# Patient Record
Sex: MALE | Race: OTHER | NOT HISPANIC OR LATINO | Employment: FULL TIME | URBAN - METROPOLITAN AREA
[De-identification: names, ages, dates, MRNs, and addresses within clinical notes are randomized per-mention and may not be internally consistent; named-entity substitution may affect disease eponyms.]

---

## 2018-05-10 ENCOUNTER — OFFICE VISIT (OUTPATIENT)
Dept: URGENT CARE | Facility: PHYSICIAN GROUP | Age: 23
End: 2018-05-10

## 2018-05-10 VITALS
HEIGHT: 76 IN | HEART RATE: 73 BPM | DIASTOLIC BLOOD PRESSURE: 70 MMHG | WEIGHT: 190 LBS | BODY MASS INDEX: 23.14 KG/M2 | OXYGEN SATURATION: 97 % | TEMPERATURE: 97.7 F | SYSTOLIC BLOOD PRESSURE: 110 MMHG

## 2018-05-10 DIAGNOSIS — L05.91 PILONIDAL CYST: ICD-10-CM

## 2018-05-10 PROCEDURE — 99202 OFFICE O/P NEW SF 15 MIN: CPT | Performed by: PHYSICIAN ASSISTANT

## 2018-05-10 RX ORDER — ACETAMINOPHEN 325 MG/1
650 TABLET ORAL EVERY 4 HOURS PRN
COMMUNITY
End: 2021-08-30

## 2018-05-10 RX ORDER — AMOXICILLIN AND CLAVULANATE POTASSIUM 875; 125 MG/1; MG/1
1 TABLET, FILM COATED ORAL 2 TIMES DAILY
Qty: 14 TAB | Refills: 0 | Status: SHIPPED | OUTPATIENT
Start: 2018-05-10 | End: 2018-05-17

## 2018-05-10 RX ORDER — IBUPROFEN 200 MG
200 TABLET ORAL EVERY 6 HOURS PRN
COMMUNITY
End: 2023-10-18

## 2018-05-10 ASSESSMENT — ENCOUNTER SYMPTOMS
FEVER: 0
COUGH: 0
PALPITATIONS: 0
SHORTNESS OF BREATH: 0
ROS SKIN COMMENTS: CYST ON TAILBONE

## 2018-05-10 NOTE — LETTER
May 10, 2018         Patient: Saji Kamara   YOB: 1995   Date of Visit: 5/10/2018           To Whom it May Concern:    Saji Kamara was seen in my clinic on 5/10/2018. Please excuse him from work 5/10-5/13/2018.  If you have any questions or concerns, please don't hesitate to call.        Sincerely,           James Arellano P.A.-C.  Electronically Signed

## 2018-05-10 NOTE — PATIENT INSTRUCTIONS
Pilonidal Cyst  Introduction  A pilonidal cyst is a fluid-filled sac. It forms beneath the skin near your tailbone, at the top of the crease of your buttocks. A pilonidal cyst that is not large or infected may not cause symptoms or problems.  If the cyst becomes irritated or infected, it may fill with pus. This causes pain and swelling (pilonidal abscess). An infected cyst may need to be treated with medicine, drained, or removed.  What are the causes?  The cause of a pilonidal cyst is not known. One cause may be a hair that grows into your skin (ingrown hair).  What increases the risk?  Pilonidal cysts are more common in boys and men. Risk factors include:  · Having lots of hair near the crease of the buttocks.  · Being overweight.  · Having a pilonidal dimple.  · Wearing tight clothing.  · Not bathing or showering frequently.  · Sitting for long periods of time.  What are the signs or symptoms?  Signs and symptoms of a pilonidal cyst may include:  · Redness.  · Pain and tenderness.  · Warmth.  · Swelling.  · Pus.  · Fever.  How is this diagnosed?  Your health care provider may diagnose a pilonidal cyst based on your symptoms and a physical exam. The health care provider may do a blood test to check for infection. If your cyst is draining pus, your health care provider may take a sample of the drainage to be tested at a laboratory.  How is this treated?  Surgery is the usual treatment for an infected pilonidal cyst. You may also have to take medicines before surgery. The type of surgery you have depends on the size and severity of the infected cyst. The different kinds of surgery include:  · Incision and drainage. This is a procedure to open and drain the cyst.  · Marsupialization. In this procedure, a large cyst or abscess may be opened and kept open by stitching the edges of the skin to the cyst walls.  · Cyst removal. This procedure involves opening the skin and removing all or part of the cyst.  Follow these  instructions at home:  · Follow all of your surgeon’s instructions carefully if you had surgery.  · Take medicines only as directed by your health care provider.  · If you were prescribed an antibiotic medicine, finish it all even if you start to feel better.  · Keep the area around your pilonidal cyst clean and dry.  · Clean the area as directed by your health care provider. Pat the area dry with a clean towel. Do not rub it as this may cause bleeding.  · Remove hair from the area around the cyst as directed by your health care provider.  · Do not wear tight clothing or sit in one place for long periods of time.  · There are many different ways to close and cover an incision, including stitches, skin glue, and adhesive strips. Follow your health care provider's instructions on:  ¨ Incision care.  ¨ Bandage (dressing) changes and removal.  ¨ Incision closure removal.  Contact a health care provider if:  · You have drainage, redness, swelling, or pain at the site of the cyst.  · You have a fever.  This information is not intended to replace advice given to you by your health care provider. Make sure you discuss any questions you have with your health care provider.  Document Released: 12/15/2001 Document Revised: 05/25/2017 Document Reviewed: 05/07/2015  © 2017 [employer]

## 2018-05-11 NOTE — PROGRESS NOTES
"Subjective:      Saji Kamara is a 23 y.o. male who presents with Tailbone Pain (x4days )            Cyst   This is a new problem. The current episode started in the past 7 days. The problem occurs constantly. The problem has been gradually improving. Pertinent negatives include no chest pain, coughing or fever. Associated symptoms comments: Draining cyst on tail bone. Treatments tried: warm compress. The treatment provided moderate relief.       Review of Systems   Constitutional: Negative for fever and malaise/fatigue.   Respiratory: Negative for cough and shortness of breath.    Cardiovascular: Negative for chest pain and palpitations.   Skin:        Cyst on tailbone     All other systems reviewed and are negative.    PMH:  has a past medical history of Screening for STD (sexually transmitted disease) (2/10/2016).  MEDS:   Current Outpatient Prescriptions:   •  acetaminophen (TYLENOL) 325 MG Tab, Take 650 mg by mouth every four hours as needed., Disp: , Rfl:   •  ibuprofen (MOTRIN) 200 MG Tab, Take 200 mg by mouth every 6 hours as needed., Disp: , Rfl:   •  amoxicillin-clavulanate (AUGMENTIN) 875-125 MG Tab, Take 1 Tab by mouth 2 times a day for 7 days., Disp: 14 Tab, Rfl: 0  •  azithromycin (ZITHROMAX) 500 MG tablet, Take all at once  Indications: Inflammation of the Urethra Not due to Gonorrhea, Disp: 4 Tab, Rfl: 0  ALLERGIES: No Known Allergies  SURGHX: History reviewed. No pertinent surgical history.  SOCHX:  reports that he has been smoking Cigarettes.  He has never used smokeless tobacco. He reports that he drinks alcohol. He reports that he does not use drugs.  FH: Family history was reviewed, no pertinent findings to report  Medications, Allergies, and current problem list reviewed today in Epic       Objective:     /70   Pulse 73   Temp 36.5 °C (97.7 °F)   Ht 1.93 m (6' 4\")   Wt 86.2 kg (190 lb)   SpO2 97%   BMI 23.13 kg/m²      Physical Exam   Constitutional: He appears well-developed and " well-nourished.   Cardiovascular: Normal rate, regular rhythm and normal heart sounds.    Pulmonary/Chest: Effort normal and breath sounds normal.   Skin: Skin is warm and dry.        Psychiatric: He has a normal mood and affect. His behavior is normal. Judgment and thought content normal.   Vitals reviewed.              Assessment/Plan:   Currently draining pilonidal cyst.  No fluctuate area.  Trial of antibiotic and warm compress.  If worsening I and D.  1. Pilonidal cyst  - warm compress 3 times daily for 15 minutes  - amoxicillin-clavulanate (AUGMENTIN) 875-125 MG Tab; Take 1 Tab by mouth 2 times a day for 7 days.  Dispense: 14 Tab; Refill: 0    Differential diagnosis, natural history, supportive care discussed. Follow-up with primary care provider within 7-10 days, emergency room precautions discussed.  Patient and/or family appears understanding of information.

## 2018-05-16 ENCOUNTER — OFFICE VISIT (OUTPATIENT)
Dept: URGENT CARE | Facility: PHYSICIAN GROUP | Age: 23
End: 2018-05-16

## 2018-05-16 VITALS
BODY MASS INDEX: 23.62 KG/M2 | HEART RATE: 60 BPM | TEMPERATURE: 97.7 F | OXYGEN SATURATION: 97 % | SYSTOLIC BLOOD PRESSURE: 118 MMHG | DIASTOLIC BLOOD PRESSURE: 84 MMHG | WEIGHT: 194 LBS | HEIGHT: 76 IN

## 2018-05-16 DIAGNOSIS — L05.91 PILONIDAL CYST WITHOUT ABSCESS: ICD-10-CM

## 2018-05-16 PROCEDURE — 99213 OFFICE O/P EST LOW 20 MIN: CPT | Performed by: EMERGENCY MEDICINE

## 2018-05-16 ASSESSMENT — ENCOUNTER SYMPTOMS
CONSTIPATION: 0
ABDOMINAL PAIN: 0
DIARRHEA: 0
FEVER: 0

## 2018-05-16 NOTE — PROGRESS NOTES
"Subjective:      Saji Kamara is a 23 y.o. male who presents with Cyst (on tailbone x1.5weeks; pt requesting removal )            Cyst   This is a new problem. Episode onset: over one week. The problem has been gradually improving. Pertinent negatives include no abdominal pain, fever or rash. Exacerbated by: pressure on site. Treatments tried: Augmentin. The treatment provided significant relief.   No trauma or similar prior events. He notes 2 episodes of spontaneous drainage at the site; currently taking Augmentin. Patient states requires surgical excision prior to being able to enter the . Requests referral for surgical excision.    Review of Systems   Constitutional: Negative for fever.   Gastrointestinal: Negative for abdominal pain, constipation and diarrhea.   Skin: Negative for rash.     PMH:  has a past medical history of Screening for STD (sexually transmitted disease) (2/10/2016).  MEDS:   Current Outpatient Prescriptions:   •  amoxicillin-clavulanate (AUGMENTIN) 875-125 MG Tab, Take 1 Tab by mouth 2 times a day for 7 days., Disp: 14 Tab, Rfl: 0  •  acetaminophen (TYLENOL) 325 MG Tab, Take 650 mg by mouth every four hours as needed., Disp: , Rfl:   •  ibuprofen (MOTRIN) 200 MG Tab, Take 200 mg by mouth every 6 hours as needed., Disp: , Rfl:   •  azithromycin (ZITHROMAX) 500 MG tablet, Take all at once  Indications: Inflammation of the Urethra Not due to Gonorrhea, Disp: 4 Tab, Rfl: 0  ALLERGIES: No Known Allergies  SURGHX: History reviewed. No pertinent surgical history.  SOCHX:  reports that he has been smoking Cigarettes.  He has never used smokeless tobacco. He reports that he drinks alcohol. He reports that he does not use drugs.  FH: family history includes Cancer in his mother and sister; Hyperlipidemia in his father; Hypertension in his father.       Objective:     /84   Pulse 60   Temp 36.5 °C (97.7 °F)   Ht 1.93 m (6' 4\")   Wt 88 kg (194 lb)   SpO2 97%   BMI 23.61 kg/m²  "     Physical Exam   Constitutional: He is oriented to person, place, and time. He appears well-developed and well-nourished. He is cooperative. He does not appear ill. No distress.   Genitourinary: Rectal exam shows no external hemorrhoid and no mass.   Musculoskeletal:        Lumbar back: He exhibits no tenderness and no bony tenderness.   Neurological: He is alert and oriented to person, place, and time. Gait normal.   Skin: Skin is warm and dry. Lesion noted. No rash noted.        Psychiatric: He has a normal mood and affect.               Assessment/Plan:     1. Pilonidal cyst without abscess  Finish course of Augmentin, warm compresses.  Advised to return if any recurrent increased swelling or drainage.  - REFERRAL TO GENERAL SURGERY

## 2018-07-02 ENCOUNTER — OFFICE VISIT (OUTPATIENT)
Dept: URGENT CARE | Facility: PHYSICIAN GROUP | Age: 23
End: 2018-07-02

## 2018-07-02 VITALS
DIASTOLIC BLOOD PRESSURE: 84 MMHG | BODY MASS INDEX: 23.62 KG/M2 | HEART RATE: 77 BPM | WEIGHT: 194 LBS | RESPIRATION RATE: 14 BRPM | SYSTOLIC BLOOD PRESSURE: 118 MMHG | TEMPERATURE: 98.1 F | OXYGEN SATURATION: 97 % | HEIGHT: 76 IN

## 2018-07-02 DIAGNOSIS — L02.91 ABSCESS: ICD-10-CM

## 2018-07-02 PROCEDURE — 99213 OFFICE O/P EST LOW 20 MIN: CPT | Performed by: FAMILY MEDICINE

## 2018-07-02 ASSESSMENT — PAIN SCALES - GENERAL: PAINLEVEL: NO PAIN

## 2018-07-02 NOTE — LETTER
July 2, 2018         Patient: Saji Kamara   YOB: 1995   Date of Visit: 7/2/2018           To Whom it May Concern:    Saji Kamara was seen in my clinic on 7/2/2018. He may return to work on 7/2/2018 without restriction..    If you have any questions or concerns, please don't hesitate to call.        Sincerely,           Walter Quintero M.D.  Electronically Signed

## 2018-07-04 ASSESSMENT — ENCOUNTER SYMPTOMS
CHILLS: 0
FEVER: 0

## 2018-07-26 ENCOUNTER — OFFICE VISIT (OUTPATIENT)
Dept: MEDICAL GROUP | Facility: PHYSICIAN GROUP | Age: 23
End: 2018-07-26

## 2018-07-26 VITALS
HEART RATE: 49 BPM | TEMPERATURE: 98.4 F | BODY MASS INDEX: 23.61 KG/M2 | HEIGHT: 76 IN | SYSTOLIC BLOOD PRESSURE: 118 MMHG | RESPIRATION RATE: 18 BRPM | OXYGEN SATURATION: 99 % | DIASTOLIC BLOOD PRESSURE: 64 MMHG | WEIGHT: 193.9 LBS

## 2018-07-26 DIAGNOSIS — L05.91 PILONIDAL CYST WITHOUT ABSCESS: ICD-10-CM

## 2018-07-26 ASSESSMENT — PATIENT HEALTH QUESTIONNAIRE - PHQ9: CLINICAL INTERPRETATION OF PHQ2 SCORE: 0

## 2018-07-27 ENCOUNTER — OFFICE VISIT (OUTPATIENT)
Dept: MEDICAL GROUP | Facility: PHYSICIAN GROUP | Age: 23
End: 2018-07-27

## 2018-07-27 VITALS
HEART RATE: 56 BPM | OXYGEN SATURATION: 98 % | RESPIRATION RATE: 18 BRPM | SYSTOLIC BLOOD PRESSURE: 118 MMHG | BODY MASS INDEX: 23.5 KG/M2 | WEIGHT: 193 LBS | TEMPERATURE: 97.6 F | DIASTOLIC BLOOD PRESSURE: 84 MMHG | HEIGHT: 76 IN

## 2018-07-27 DIAGNOSIS — L05.91 PILONIDAL CYST WITHOUT ABSCESS: ICD-10-CM

## 2018-07-27 PROCEDURE — 99213 OFFICE O/P EST LOW 20 MIN: CPT | Performed by: NURSE PRACTITIONER

## 2018-07-27 ASSESSMENT — ENCOUNTER SYMPTOMS
FEVER: 0
MYALGIAS: 0
CHILLS: 0

## 2018-07-27 NOTE — PROGRESS NOTES
Subjective:   Saji Kamara is a 23 y.o. male here today for follow up on pilonidal cyst. He is not interested in establishing care at this facility.    Pilonidal cyst without abscess  Patient was evaluated on May 21, 2018 at [facility] for complaints of coccyx pain.  He was diagnosed with a pilonidal cyst.  Provider recommendations were conservative therapy initially with plans for reserving surgery if conservative treatments failed.  He was prescribed azithromycin ×10 days.  Patient was seen again on July 2 at [place] for follow-up of cyst.  His symptoms had resolved at this time.    Today he is requesting [facility] paperwork from May 21 - July 2 during his time of recovery.        Current medicines (including changes today)  Current Outpatient Prescriptions   Medication Sig Dispense Refill   • acetaminophen (TYLENOL) 325 MG Tab Take 650 mg by mouth every four hours as needed.     • ibuprofen (MOTRIN) 200 MG Tab Take 200 mg by mouth every 6 hours as needed.       No current facility-administered medications for this visit.      He  has a past medical history of Screening for STD (sexually transmitted disease) (2/10/2016).    Social History     Social History   • Marital status: Single     Spouse name: N/A   • Number of children: N/A   • Years of education: N/A     Occupational History   • Not on file.     Social History Main Topics   • Smoking status: Current Every Day Smoker     Types: Cigarettes   • Smokeless tobacco: Never Used      Comment: 1 or 2 cigarettes a day    • Alcohol use Yes      Comment: occasional   • Drug use: No   • Sexual activity: Yes     Partners: Female     Other Topics Concern   • Sleep Concern No   • Stress Concern No   • Weight Concern No   • Bike Helmet Yes   • Seat Belt Yes     Social History Narrative    Lives with mom, dad, brother       Review of Systems   Constitutional: Negative for chills and fever.   Musculoskeletal: Negative for joint pain and myalgias.   Skin:  "Negative for itching and rash.      Objective:     Blood pressure 118/84, pulse (!) 56, temperature 36.4 °C (97.6 °F), resp. rate 18, height 1.93 m (6' 4\"), weight 87.5 kg (193 lb), SpO2 98 %. Body mass index is 23.49 kg/m².     Physical Exam:  Constitutional: Oriented to person, place, and time and well-developed, well-nourished, and in no distress.   HENT:   Head: Normocephalic and atraumatic.   Eyes: Conjunctivae and EOM are normal. Pupils are equal, round, and reactive to light.   Neck: Normal range of motion. Neck supple.  Cardiovascular: Normal rate, regular rhythm, normal heart sounds.  Exam reveals no friction rub. No murmur heard.  Pulmonary/Chest: Effort normal and breath sounds normal. No respiratory distress or use of accessory muscles. No wheezes, rhonchi, or rales.   Musculoskeletal: Full range of motion.   Neurological: Alert and oriented to person, place, and time. Gait normal.   Skin: Skin is warm and dry. No cyanosis. No edema.  Psychiatric: Mood, memory, affect and judgment normal.       Assessment and Plan:   The following treatment plan was discussed    1. Pilonidal cyst without abscess  His symptoms have resolved. Reviewed previous progress notes. [facility] paperwork has been completed.    Total 30 min spent face-to- face, >50% of total time discussing patient issues and symptoms as listed above in assessment and plan, reviewing progress notes with patient, and completing [facility] paperwork with patient.    Followup: Return if symptoms worsen or fail to improve.         "

## 2018-07-27 NOTE — ASSESSMENT & PLAN NOTE
Patient was evaluated on May 21, 2018 at [facility] for complaints of coccyx pain.  He was diagnosed with a pilonidal cyst.  Provider recommendations were conservative therapy initially with plans for reserving surgery if conservative treatments failed.  He was prescribed azithromycin ×10 days.  Patient was seen again on July 2 at [facility] for follow-up of cyst.  His symptoms had resolved at this time.    Today he is requesting [facility] paperwork from May 21 - July 2 during his time of recovery.

## 2020-11-30 ENCOUNTER — HOSPITAL ENCOUNTER (OUTPATIENT)
Facility: MEDICAL CENTER | Age: 25
End: 2020-11-30
Attending: NURSE PRACTITIONER

## 2020-11-30 ENCOUNTER — OFFICE VISIT (OUTPATIENT)
Dept: URGENT CARE | Facility: CLINIC | Age: 25
End: 2020-11-30

## 2020-11-30 VITALS
BODY MASS INDEX: 23.62 KG/M2 | HEART RATE: 97 BPM | DIASTOLIC BLOOD PRESSURE: 82 MMHG | TEMPERATURE: 98.1 F | OXYGEN SATURATION: 100 % | SYSTOLIC BLOOD PRESSURE: 122 MMHG | WEIGHT: 194 LBS | RESPIRATION RATE: 16 BRPM | HEIGHT: 76 IN

## 2020-11-30 DIAGNOSIS — J02.9 SORE THROAT: ICD-10-CM

## 2020-11-30 DIAGNOSIS — J03.90 EXUDATIVE TONSILLITIS: ICD-10-CM

## 2020-11-30 LAB
HETEROPH AB SER QL LA: NEGATIVE
INT CON NEG: NORMAL
INT CON NEG: NORMAL
INT CON POS: NORMAL
INT CON POS: NORMAL
S PYO AG THROAT QL: NEGATIVE

## 2020-11-30 PROCEDURE — 86308 HETEROPHILE ANTIBODY SCREEN: CPT | Performed by: NURSE PRACTITIONER

## 2020-11-30 PROCEDURE — 99214 OFFICE O/P EST MOD 30 MIN: CPT | Performed by: NURSE PRACTITIONER

## 2020-11-30 PROCEDURE — 96372 THER/PROPH/DIAG INJ SC/IM: CPT | Performed by: NURSE PRACTITIONER

## 2020-11-30 PROCEDURE — 87880 STREP A ASSAY W/OPTIC: CPT | Performed by: NURSE PRACTITIONER

## 2020-11-30 PROCEDURE — 87070 CULTURE OTHR SPECIMN AEROBIC: CPT

## 2020-11-30 RX ORDER — LIDOCAINE HYDROCHLORIDE 20 MG/ML
5 SOLUTION OROPHARYNGEAL 4 TIMES DAILY PRN
Qty: 120 ML | Refills: 0 | Status: SHIPPED | OUTPATIENT
Start: 2020-11-30 | End: 2021-08-30

## 2020-11-30 RX ORDER — DEXAMETHASONE SODIUM PHOSPHATE 4 MG/ML
8 INJECTION, SOLUTION INTRA-ARTICULAR; INTRALESIONAL; INTRAMUSCULAR; INTRAVENOUS; SOFT TISSUE ONCE
Status: COMPLETED | OUTPATIENT
Start: 2020-11-30 | End: 2020-11-30

## 2020-11-30 RX ORDER — PENICILLIN V POTASSIUM 500 MG/1
500 TABLET ORAL 3 TIMES DAILY
Qty: 30 TAB | Refills: 0 | Status: SHIPPED | OUTPATIENT
Start: 2020-11-30 | End: 2020-12-10

## 2020-11-30 RX ADMIN — DEXAMETHASONE SODIUM PHOSPHATE 8 MG: 4 INJECTION, SOLUTION INTRA-ARTICULAR; INTRALESIONAL; INTRAMUSCULAR; INTRAVENOUS; SOFT TISSUE at 15:07

## 2020-11-30 SDOH — HEALTH STABILITY: MENTAL HEALTH: HOW OFTEN DO YOU HAVE A DRINK CONTAINING ALCOHOL?: MONTHLY OR LESS

## 2020-11-30 NOTE — LETTER
November 30, 2020         Patient: Saji Kamara   YOB: 1995   Date of Visit: 11/30/2020           To Whom it May Concern:    Saji Kamara was seen in my clinic on 11/30/2020. He may be excused from work tomorrow.     If you have any questions or concerns, please don't hesitate to call.        Sincerely,           OSMAR Acosta.  Electronically Signed

## 2020-11-30 NOTE — PROGRESS NOTES
Chief Complaint   Patient presents with   • Pharyngitis     inflamed tonsils putting pressure on sinuses causing headaches, uvula is swollen x3 days.       HISTORY OF PRESENT ILLNESS: Patient is a 25 y.o. male who presents today due to complaints of a sore throat for the past three days. Reports associated uvula swelling, pain with swallowing, headaches. Pain is moderate. Denies associated fever, respiratory distress, rash, chest pain, urinary complaints, congestion, cough, or difficulty breathing.  He has tried OTC medications at home without much improvement.  Denies known ill contacts.       Patient Active Problem List    Diagnosis Date Noted   • Pilonidal cyst without abscess 2018       Allergies:Patient has no known allergies.    Current Outpatient Medications Ordered in Epic   Medication Sig Dispense Refill   • penicillin v potassium (VEETID) 500 MG Tab Take 1 Tab by mouth 3 times a day for 10 days. 30 Tab 0   • lidocaine (XYLOCAINE) 2 % Solution Take 5 mL by mouth 4 times a day as needed for Throat/Mouth Pain. Mix 5mL in 1/4 cup of water, swish medication and spit. 120 mL 0   • ibuprofen (MOTRIN) 200 MG Tab Take 200 mg by mouth every 6 hours as needed.     • acetaminophen (TYLENOL) 325 MG Tab Take 650 mg by mouth every four hours as needed.       No current Epic-ordered facility-administered medications on file.        Past Medical History:   Diagnosis Date   • Screening for STD (sexually transmitted disease) 2/10/2016       Social History     Tobacco Use   • Smoking status: Former Smoker     Types: Cigarettes   • Smokeless tobacco: Never Used   • Tobacco comment: 1 or 2 cigarettes a day    Substance Use Topics   • Alcohol use: Yes     Frequency: Monthly or less     Comment: occasional   • Drug use: No       Family Status   Relation Name Status   • Mo  Alive   • Fa  Alive   • MGMo     • MGFa     • PGMo  Alive   • PGFa  Alive   • Bro  Alive   • Sis  Alive           • Neg Hx  (Not  "Specified)     Family History   Problem Relation Age of Onset   • Cancer Mother         luekemia   • Hypertension Father    • Hyperlipidemia Father    • Cancer Sister         hogkins lymphoma   • Alcohol/Drug Neg Hx    • Allergies Neg Hx    • Anesthesia Neg Hx    • Arthritis Neg Hx    • Blood Disease Neg Hx    • Diabetes Neg Hx    • Genetic Disorder Neg Hx    • GI Disease Neg Hx    • Genitourinary () Problems Neg Hx    • Heart Disease Neg Hx    • Psychiatric Illness Neg Hx    • Stroke Neg Hx    • Thyroid Neg Hx        ROS:  Review of Systems   Constitutional: Negative for weight loss, fever, and fatigue.   HENT: Positive for sore throat. Negative for ear pain, nosebleeds, congestion.   Eyes: Negative for vision changes.   Neuro: Positive for headaches.  Cardiovascular: Negative for chest pain, palpitations, orthopnea and leg swelling.   Respiratory: Negative for cough, sputum production, shortness of breath and wheezing.   Gastrointestinal: Negative for abdominal pain, nausea, vomiting or diarrhea.   : Negative for changes in urination.   Skin: Negative for rash, diaphoresis.     Exam:  /82 (BP Location: Left arm, Patient Position: Sitting, BP Cuff Size: Adult)   Pulse 97   Temp 36.7 °C (98.1 °F) (Temporal)   Resp 16   Ht 1.93 m (6' 4\")   Wt 88 kg (194 lb)   SpO2 100%   General: well-nourished, well-developed male in NAD  Head: normocephalic, atraumatic  Eyes: PERRLA, no conjunctival injection, acuity grossly intact, lids normal.  Ears: normal shape and symmetry, no tenderness, no discharge. External canals are without any significant edema or erythema. Tympanic membranes are without any inflammation, no effusion. Gross auditory acuity is intact.  Nose: symmetrical without tenderness, no discharge.  Mouth/Throat: reasonable hygiene. There is erythema, with exudates and tonsillar enlargement present.  Swollen uvula noted.  Airway is patent.  Neck: no masses, range of motion within normal limits, no " tracheal deviation. No obvious thyroid enlargement.   Lymph: bilateral anterior cervical adenopathy. No supraclavicular adenopathy.   Neuro: alert and oriented. Cranial nerves 1-12 grossly intact. No sensory deficit.   Cardiovascular: regular rate and rhythm. No edema.  Pulmonary: no distress. Chest is symmetrical with respiration, no wheezes, crackles, or rhonchi.   Musculoskeletal: no clubbing, appropriate muscle tone, gait is stable.  Skin: warm, dry, intact, no clubbing, no cyanosis, no rashes.   Psych: appropriate mood, affect, judgement.       POC strep negative    POC mono negative      Assessment/Plan:  1. Exudative tonsillitis  CULTURE THROAT    penicillin v potassium (VEETID) 500 MG Tab    lidocaine (XYLOCAINE) 2 % Solution   2. Sore throat  POCT Rapid Strep A    POCT Mononucleosis (mono)    dexamethasone (DECADRON) injection 8 mg    CULTURE THROAT    penicillin v potassium (VEETID) 500 MG Tab    lidocaine (XYLOCAINE) 2 % Solution           Antibiotic as directed due to suspected bacterial etiology, throat culture obtained and sent for testing.  Decadron given in office, tolerated well.  Lidocaine as needed.  OTC motrin or tylenol for pain/fever control. Hand hygiene. Increase fluid intake, rest. Warm salt water gargles.   Supportive care, differential diagnoses, and indications for immediate follow-up discussed with patient.   Pathogenesis of diagnosis discussed including typical length and natural progression.   Instructed to return to clinic or nearest emergency department for any change in condition, further concerns, or worsening of symptoms.  Patient states understanding of the plan of care and discharge instructions.  Instructed to make an appointment, for follow up, with his primary care provider.        Please note that this dictation was created using voice recognition software. I have made every reasonable attempt to correct obvious errors, but I expect that there are errors of grammar and  possibly content that I did not discover before finalizing the note. N95 and safety glasses used for entire visit.       OSMAR Acosta.

## 2020-12-01 DIAGNOSIS — J03.90 EXUDATIVE TONSILLITIS: ICD-10-CM

## 2020-12-01 DIAGNOSIS — J02.9 SORE THROAT: ICD-10-CM

## 2020-12-03 LAB
BACTERIA SPEC RESP CULT: NORMAL
SIGNIFICANT IND 70042: NORMAL
SITE SITE: NORMAL
SOURCE SOURCE: NORMAL

## 2021-02-16 ENCOUNTER — HOSPITAL ENCOUNTER (OUTPATIENT)
Facility: MEDICAL CENTER | Age: 26
End: 2021-02-16
Attending: PHYSICIAN ASSISTANT

## 2021-02-16 ENCOUNTER — OFFICE VISIT (OUTPATIENT)
Dept: URGENT CARE | Facility: PHYSICIAN GROUP | Age: 26
End: 2021-02-16

## 2021-02-16 VITALS
HEIGHT: 76 IN | RESPIRATION RATE: 20 BRPM | BODY MASS INDEX: 23.99 KG/M2 | DIASTOLIC BLOOD PRESSURE: 78 MMHG | HEART RATE: 74 BPM | TEMPERATURE: 97.7 F | SYSTOLIC BLOOD PRESSURE: 104 MMHG | OXYGEN SATURATION: 97 % | WEIGHT: 197 LBS

## 2021-02-16 DIAGNOSIS — R36.9 PENILE DISCHARGE: ICD-10-CM

## 2021-02-16 DIAGNOSIS — R30.0 DYSURIA: ICD-10-CM

## 2021-02-16 LAB
APPEARANCE UR: NORMAL
BILIRUB UR STRIP-MCNC: NORMAL MG/DL
COLOR UR AUTO: YELLOW
GLUCOSE UR STRIP.AUTO-MCNC: NORMAL MG/DL
KETONES UR STRIP.AUTO-MCNC: NORMAL MG/DL
LEUKOCYTE ESTERASE UR QL STRIP.AUTO: NORMAL
NITRITE UR QL STRIP.AUTO: NORMAL
PH UR STRIP.AUTO: 6 [PH] (ref 5–8)
PROT UR QL STRIP: NORMAL MG/DL
RBC UR QL AUTO: NORMAL
SP GR UR STRIP.AUTO: 1.03
UROBILINOGEN UR STRIP-MCNC: NORMAL MG/DL

## 2021-02-16 PROCEDURE — 99213 OFFICE O/P EST LOW 20 MIN: CPT | Performed by: PHYSICIAN ASSISTANT

## 2021-02-16 PROCEDURE — 87591 N.GONORRHOEAE DNA AMP PROB: CPT

## 2021-02-16 PROCEDURE — 81002 URINALYSIS NONAUTO W/O SCOPE: CPT | Performed by: PHYSICIAN ASSISTANT

## 2021-02-16 PROCEDURE — 87491 CHLMYD TRACH DNA AMP PROBE: CPT

## 2021-02-16 RX ORDER — AZITHROMYCIN 500 MG/1
1000 TABLET, FILM COATED ORAL ONCE
Qty: 2 TABLET | Refills: 0 | Status: SHIPPED | OUTPATIENT
Start: 2021-02-16 | End: 2021-02-16

## 2021-02-16 ASSESSMENT — ENCOUNTER SYMPTOMS
VOMITING: 0
CHILLS: 0
FEVER: 0
PALPITATIONS: 0
DIARRHEA: 0
DIZZINESS: 0
MYALGIAS: 0
SORE THROAT: 0
NAUSEA: 0
HEADACHES: 0
ABDOMINAL PAIN: 0
FLANK PAIN: 0

## 2021-02-16 NOTE — PROGRESS NOTES
Subjective:   Saji Kamara is a 25 y.o. male who presents for Urinary Frequency (burn upon urination, discharge x 1 week)    HPI:  This is a very pleasant 25-year-old male presenting to the clinic with dysuria and penile discharge x1 week.  Patient states he had intercourse with a new female partner and believes he may have contracted chlamydia.  Currently has mild dysuria with every urination.  He has also noticed some thick whitish penile discharge.  He denies any rashes or lesions.  Denies any testicular pain.  He denies any nausea, vomiting, fevers or chills.  He has had chlamydia 1 time in the past and states this feels similar.      Review of Systems   Constitutional: Negative for chills, fever and malaise/fatigue.   HENT: Negative for sore throat.    Cardiovascular: Negative for chest pain and palpitations.   Gastrointestinal: Negative for abdominal pain, diarrhea, nausea and vomiting.   Genitourinary: Positive for dysuria. Negative for flank pain, frequency, hematuria and urgency.        Penile discharge   Musculoskeletal: Negative for myalgias.   Skin: Negative for rash.   Neurological: Negative for dizziness and headaches.       Medications:    • acetaminophen Tabs  • azithromycin  • ceftriaxone (ROCEPHIN) 250 mg - lidocaine 1% 0.9 mL for IM use  • ibuprofen Tabs  • lidocaine Soln    Allergies: Patient has no known allergies.    Problem List: Saji Kamara has Pilonidal cyst without abscess on their problem list.    Surgical History:  No past surgical history on file.    Past Social Hx: Saji Kamara  reports that he has quit smoking. His smoking use included cigarettes. He has never used smokeless tobacco. He reports current alcohol use. He reports that he does not use drugs.     Past Family Hx:  Saji Kamara family history includes Cancer in his mother and sister; Hyperlipidemia in his father; Hypertension in his father.     Problem list, medications, and allergies reviewed by myself today in  "Epic.     Objective:     /78 (BP Location: Left arm, Patient Position: Sitting, BP Cuff Size: Adult)   Pulse 74   Temp 36.5 °C (97.7 °F) (Temporal)   Resp 20   Ht 1.93 m (6' 4\")   Wt 89.4 kg (197 lb)   SpO2 97%   BMI 23.98 kg/m²     Physical Exam  Constitutional:       General: He is not in acute distress.     Appearance: Normal appearance. He is not ill-appearing, toxic-appearing or diaphoretic.   HENT:      Head: Normocephalic and atraumatic.   Eyes:      Conjunctiva/sclera: Conjunctivae normal.   Cardiovascular:      Rate and Rhythm: Normal rate and regular rhythm.   Genitourinary:     Comments: Deferred  Musculoskeletal:         General: Normal range of motion.      Cervical back: Normal range of motion.   Skin:     Capillary Refill: Capillary refill takes less than 2 seconds.   Neurological:      General: No focal deficit present.      Mental Status: He is alert and oriented to person, place, and time. Mental status is at baseline.     Urinalysis: Within normal limits.  GC chlamydia: Pending      Assessment/Plan:     Diagnosis and associated orders:     1. Penile discharge  POCT Urinalysis    Chlamydia/GC PCR Urine Or Swab    azithromycin (ZITHROMAX) 500 MG tablet    cefTRIAXone (ROCEPHIN) 250 mg, lidocaine (XYLOCAINE) 1 % 0.9 mL for IM use   2. Dysuria  POCT Urinalysis    Chlamydia/GC PCR Urine Or Swab      Comments/MDM:       • Differential diagnoses and treatment options were discussed with the patient at length today.  Patient has had penile discharge and dysuria for 1 week.  States it feels similar to his previous episode of chlamydia.  He did have contact with a new partner.  At this time we decided to empirically treat for gonorrhea and chlamydia.  Instructed to inform partner for the necessary testing and treatment.  Abstain from intercourse for at least 1 week.  We will follow-up once final results are available.  Encouraged to call with any questions or concerns.           Differential " diagnosis, natural history, supportive care, and indications for immediate follow-up discussed.    Advised the patient to follow-up with the primary care physician for recheck, reevaluation, and consideration of further management.    Please note that this dictation was created using voice recognition software. I have made reasonable attempt to correct obvious errors, but I expect that there are errors of grammar and possibly content that I did not discover before finalizing the note.    This note was electronically signed by NIKKO Wilder PA-C

## 2021-02-17 DIAGNOSIS — R30.0 DYSURIA: ICD-10-CM

## 2021-02-17 DIAGNOSIS — R36.9 PENILE DISCHARGE: ICD-10-CM

## 2021-02-17 LAB
C TRACH DNA SPEC QL NAA+PROBE: NEGATIVE
N GONORRHOEA DNA SPEC QL NAA+PROBE: NEGATIVE
SPECIMEN SOURCE: NORMAL

## 2021-06-22 ENCOUNTER — OFFICE VISIT (OUTPATIENT)
Dept: URGENT CARE | Facility: CLINIC | Age: 26
End: 2021-06-22

## 2021-06-22 VITALS
HEART RATE: 75 BPM | WEIGHT: 201.4 LBS | HEIGHT: 76 IN | SYSTOLIC BLOOD PRESSURE: 108 MMHG | RESPIRATION RATE: 16 BRPM | OXYGEN SATURATION: 97 % | DIASTOLIC BLOOD PRESSURE: 80 MMHG | TEMPERATURE: 97.6 F | BODY MASS INDEX: 24.52 KG/M2

## 2021-06-22 DIAGNOSIS — L73.9 FOLLICULITIS: ICD-10-CM

## 2021-06-22 DIAGNOSIS — L02.91 ABSCESS OF MULTIPLE SITES: ICD-10-CM

## 2021-06-22 PROCEDURE — 10061 I&D ABSCESS COMP/MULTIPLE: CPT | Performed by: PHYSICIAN ASSISTANT

## 2021-06-22 RX ORDER — SULFAMETHOXAZOLE AND TRIMETHOPRIM 800; 160 MG/1; MG/1
1 TABLET ORAL 2 TIMES DAILY
Qty: 10 TABLET | Refills: 0 | Status: SHIPPED | OUTPATIENT
Start: 2021-06-22 | End: 2021-06-27

## 2021-06-22 ASSESSMENT — ENCOUNTER SYMPTOMS
FEVER: 0
CHILLS: 0

## 2021-06-22 NOTE — PROCEDURES
I and D    Date/Time: 6/22/2021 2:35 PM  Performed by: Honorio Stewart P.A.-C.  Authorized by: Honorio Stewart P.A.-C.   Type: abscess  Body area: head  Location details: scalp  Anesthesia: local infiltration    Anesthesia:  Local Anesthetic: lidocaine 1% with epinephrine  Anesthetic total: 0.25 mL    Sedation:  Patient sedated: no    Scalpel size: 11  Incision type: single straight  Incision depth: dermal  Complexity: simple  Drainage: purulent  Drainage amount: moderate  Wound treatment: wound left open  Patient tolerance: patient tolerated the procedure well with no immediate complications

## 2021-06-22 NOTE — PROGRESS NOTES
"Subjective:   Saji Kamara is a 26 y.o. male who presents for Rash (scalp, getting open wounds and reddness, starting to get painful, x2weeks)        Patient complains of painful red rash on his scalp x2 weeks.  States that symptoms have been worsening and he noticed some drainage from one of the sites on the scalp.  He denies nausea, vomiting, fevers, chills.  Has never had symptoms of this nature previously.  He is not taking any medications for symptoms.    Review of Systems   Constitutional: Negative for chills and fever.   Skin: Positive for rash.       PMH:  has a past medical history of Screening for STD (sexually transmitted disease) (2/10/2016).  MEDS:   Current Outpatient Medications:   •  sulfamethoxazole-trimethoprim (BACTRIM DS) 800-160 MG tablet, Take 1 tablet by mouth 2 times a day for 5 days., Disp: 10 tablet, Rfl: 0  •  acetaminophen (TYLENOL) 325 MG Tab, Take 650 mg by mouth every four hours as needed., Disp: , Rfl:   •  ibuprofen (MOTRIN) 200 MG Tab, Take 200 mg by mouth every 6 hours as needed., Disp: , Rfl:   •  lidocaine (XYLOCAINE) 2 % Solution, Take 5 mL by mouth 4 times a day as needed for Throat/Mouth Pain. Mix 5mL in 1/4 cup of water, swish medication and spit. (Patient not taking: Reported on 2/16/2021), Disp: 120 mL, Rfl: 0  ALLERGIES: No Known Allergies  SURGHX: History reviewed. No pertinent surgical history.  SOCHX:  reports that he has quit smoking. His smoking use included cigarettes. He has never used smokeless tobacco. He reports current alcohol use. He reports that he does not use drugs.  FH: Family history was reviewed, no pertinent findings to report   Objective:   /80 (BP Location: Left arm, Patient Position: Sitting, BP Cuff Size: Adult long)   Pulse 75   Temp 36.4 °C (97.6 °F) (Temporal)   Resp 16   Ht 1.93 m (6' 4\")   Wt 91.4 kg (201 lb 6.4 oz)   SpO2 97%   BMI 24.52 kg/m²   Physical Exam  Vitals reviewed.   Constitutional:       General: He is not in acute " distress.     Appearance: Normal appearance. He is well-developed. He is not toxic-appearing.   HENT:      Head: Normocephalic and atraumatic.        Comments: Patient has numerous pustules contain hair follicles on scalp.  There is erythema and warmth surrounding the 2 masses.     Right Ear: External ear normal.      Left Ear: External ear normal.      Nose: Nose normal.   Eyes:      General: Gaze aligned appropriately.   Cardiovascular:      Rate and Rhythm: Normal rate and regular rhythm.   Pulmonary:      Effort: Pulmonary effort is normal. No respiratory distress.      Breath sounds: No stridor.   Musculoskeletal:      Cervical back: Neck supple.   Skin:     General: Skin is warm and dry.      Capillary Refill: Capillary refill takes less than 2 seconds.   Neurological:      Mental Status: He is alert and oriented to person, place, and time.      Comments: CN2-12 grossly intact   Psychiatric:         Speech: Speech normal.         Behavior: Behavior normal.           Assessment/Plan:   1. Abscess of multiple sites  - sulfamethoxazole-trimethoprim (BACTRIM DS) 800-160 MG tablet; Take 1 tablet by mouth 2 times a day for 5 days.  Dispense: 10 tablet; Refill: 0  - I and D  - I and D    2. Folliculitis  - sulfamethoxazole-trimethoprim (BACTRIM DS) 800-160 MG tablet; Take 1 tablet by mouth 2 times a day for 5 days.  Dispense: 10 tablet; Refill: 0    1.  Lesions incised and drained.  Wound care instructions given.  Return to clinic with recurring symptoms or any new and worsening symptoms.    2.  Patient has multiple areas of folliculitis that likely precipitated formation of abscesses.  I would like patient to wash these areas with soap and water.  I believe that topical treatment will be insufficient due to extent of lesions.  Patient started on 5-day course of Bactrim.  If symptoms persist or fail to fully resolve despite this return to clinic for reevaluation.    Differential diagnosis, natural history, supportive  care, and indications for immediate follow-up discussed.

## 2021-06-22 NOTE — PROCEDURES
I and D    Date/Time: 6/22/2021 2:32 PM  Performed by: Honorio Stewart P.A.-C.  Authorized by: Honorio Stewatr P.A.-C.   Type: abscess  Body area: head  Anesthesia: local infiltration    Anesthesia:  Local Anesthetic: lidocaine 1% with epinephrine  Anesthetic total: 0.5 mL    Sedation:  Patient sedated: no    Scalpel size: 11  Incision type: single straight  Incision depth: dermal  Complexity: simple  Drainage: purulent  Drainage amount: moderate  Wound treatment: wound left open  Packing material: none  Patient tolerance: patient tolerated the procedure well with no immediate complications

## 2021-06-22 NOTE — PATIENT INSTRUCTIONS
Abscess  Care After  An abscess (also called a boil or furuncle) is an infected area that contains a collection of pus. Signs and symptoms of an abscess include pain, tenderness, redness, or hardness, or you may feel a moveable soft area under your skin. An abscess can occur anywhere in the body. The infection may spread to surrounding tissues causing cellulitis. A cut (incision) by the surgeon was made over your abscess and the pus was drained out. Gauze may have been packed into the space to provide a drain that will allow the cavity to heal from the inside outwards. The boil may be painful for 5 to 7 days. Most people with a boil do not have high fevers. Your abscess, if seen early, may not have localized, and may not have been lanced. If not, another appointment may be required for this if it does not get better on its own or with medications.  HOME CARE INSTRUCTIONS   · Only take over-the-counter or prescription medicines for pain, discomfort, or fever as directed by your caregiver.  · When you bathe, soak and then remove gauze or iodoform packs at least daily or as directed by your caregiver. You may then wash the wound gently with mild soapy water. Repack with gauze or do as your caregiver directs.  SEEK IMMEDIATE MEDICAL CARE IF:   · You develop increased pain, swelling, redness, drainage, or bleeding in the wound site.  · You develop signs of generalized infection including muscle aches, chills, fever, or a general ill feeling.  · An oral temperature above 102° F (38.9° C) develops, not controlled by medication.  See your caregiver for a recheck if you develop any of the symptoms described above. If medications (antibiotics) were prescribed, take them as directed.  Document Released: 07/06/2006 Document Revised: 03/11/2013 Document Reviewed: 03/02/2009  [employer]® Patient Information ©2014 [employer].

## 2021-08-30 ENCOUNTER — OFFICE VISIT (OUTPATIENT)
Dept: URGENT CARE | Facility: CLINIC | Age: 26
End: 2021-08-30

## 2021-08-30 VITALS
BODY MASS INDEX: 25.72 KG/M2 | OXYGEN SATURATION: 95 % | TEMPERATURE: 98.1 F | HEART RATE: 80 BPM | WEIGHT: 211.2 LBS | RESPIRATION RATE: 16 BRPM | SYSTOLIC BLOOD PRESSURE: 116 MMHG | DIASTOLIC BLOOD PRESSURE: 74 MMHG | HEIGHT: 76 IN

## 2021-08-30 DIAGNOSIS — B35.0 TINEA CAPITIS: ICD-10-CM

## 2021-08-30 PROCEDURE — 99214 OFFICE O/P EST MOD 30 MIN: CPT | Performed by: FAMILY MEDICINE

## 2021-08-30 RX ORDER — COVID-19 MOLECULAR TEST ASSAY
KIT MISCELLANEOUS
COMMUNITY
Start: 2021-08-03 | End: 2023-10-18

## 2021-08-30 RX ORDER — TERBINAFINE HYDROCHLORIDE 250 MG/1
250 TABLET ORAL DAILY
Qty: 28 TABLET | Refills: 0 | Status: SHIPPED | OUTPATIENT
Start: 2021-08-30 | End: 2021-09-27

## 2021-08-30 ASSESSMENT — ENCOUNTER SYMPTOMS
EYE DISCHARGE: 0
WEIGHT LOSS: 0
EYE REDNESS: 0
SENSORY CHANGE: 0
FOCAL WEAKNESS: 0

## 2021-08-31 NOTE — PROGRESS NOTES
"Subjective     Saji Kamara is a 26 y.o. male who presents with Rash (pt has a rash and blisters on scalp x 3 months)            3 months scalp inflammation and hair loss.  He suspects fungal infection given that he was seen previously and placed on antibiotics without improvement.  + Itching.  No other skin lesions.  No oral lesions.  No fevers.  No PMH/FH autoimmune disease.  No other aggravating or alleviating factors.      Review of Systems   Constitutional: Negative for malaise/fatigue and weight loss.   Eyes: Negative for discharge and redness.   Neurological: Negative for sensory change and focal weakness.              Objective     /74 (BP Location: Left arm, Patient Position: Sitting, BP Cuff Size: Adult)   Pulse 80   Temp 36.7 °C (98.1 °F) (Temporal)   Resp 16   Ht 1.93 m (6' 4\")   Wt 95.8 kg (211 lb 3.2 oz)   SpO2 95%   BMI 25.71 kg/m²      Physical Exam  Constitutional:       Appearance: Normal appearance.   HENT:      Head:     Skin:     General: Skin is warm and dry.   Neurological:      Mental Status: He is alert.                             Assessment & Plan   Urgent care office visit 6/22/2021 reviewed.      1. Tinea capitis  terbinafine (LAMISIL) 250 MG Tab    REFERRAL TO DERMATOLOGY     Differential diagnosis, natural history, supportive care, and indications for immediate follow-up discussed at length.      We will treat for possible fungal infection.  Referral to dermatology placed for chronic recurrent problem.                "

## 2022-05-09 ENCOUNTER — APPOINTMENT (RX ONLY)
Dept: URBAN - METROPOLITAN AREA CLINIC 6 | Facility: CLINIC | Age: 27
Setting detail: DERMATOLOGY
End: 2022-05-09

## 2022-05-09 DIAGNOSIS — L66.8 OTHER CICATRICIAL ALOPECIA: ICD-10-CM

## 2022-05-09 DIAGNOSIS — L66.89 OTHER CICATRICIAL ALOPECIA: ICD-10-CM

## 2022-05-09 PROCEDURE — ? COUNSELING

## 2022-05-09 PROCEDURE — 99203 OFFICE O/P NEW LOW 30 MIN: CPT | Mod: 25

## 2022-05-09 PROCEDURE — ? PRESCRIPTION

## 2022-05-09 PROCEDURE — 11900 INJECT SKIN LESIONS </W 7: CPT

## 2022-05-09 PROCEDURE — ? SEPARATE AND IDENTIFIABLE DOCUMENTATION

## 2022-05-09 PROCEDURE — ? INTRALESIONAL KENALOG

## 2022-05-09 RX ORDER — DOXYCYCLINE HYCLATE 100 MG/1
1 TABLET, COATED ORAL BID
Qty: 60 | Refills: 2 | Status: ERX | COMMUNITY
Start: 2022-05-09

## 2022-05-09 RX ADMIN — DOXYCYCLINE HYCLATE 1: 100 TABLET, COATED ORAL at 00:00

## 2022-05-09 ASSESSMENT — LOCATION DETAILED DESCRIPTION DERM
LOCATION DETAILED: RIGHT SUPERIOR PARIETAL SCALP
LOCATION DETAILED: RIGHT CENTRAL PARIETAL SCALP
LOCATION DETAILED: LEFT CENTRAL PARIETAL SCALP
LOCATION DETAILED: LEFT SUPERIOR PARIETAL SCALP
LOCATION DETAILED: MID-FRONTAL SCALP
LOCATION DETAILED: POSTERIOR MID-PARIETAL SCALP

## 2022-05-09 ASSESSMENT — LOCATION SIMPLE DESCRIPTION DERM
LOCATION SIMPLE: SCALP
LOCATION SIMPLE: ANTERIOR SCALP
LOCATION SIMPLE: POSTERIOR SCALP

## 2022-05-09 ASSESSMENT — LOCATION ZONE DERM: LOCATION ZONE: SCALP

## 2022-05-09 NOTE — PROCEDURE: INTRALESIONAL KENALOG
Include Z78.9 (Other Specified Conditions Influencing Health Status) As An Associated Diagnosis?: No
Kenalog Preparation: Kenalog
Medical Necessity Clause: This procedure was medically necessary because the lesions that were treated were:
Size Of Lesion (Optional): 1.5
Validate Note Data When Using Inventory: Yes
Lot # For Kenalog (Optional): [id number]
Detail Level: Detailed
Administered By (Optional): ODILIA
X Size Of Lesion In Cm (Optional): 0
Total Volume (Ccs): 0.3
Expiration Date For Kenalog (Optional): 4/23
Consent: The risks of atrophy were reviewed with the patient.
Concentration Of Kenalog Solution Injected (Mg/Ml): 5.0

## 2022-05-09 NOTE — HPI: SKIN LESIONS
Is This A New Presentation, Or A Follow-Up?: Skin Lesions
How Severe Is Your Skin Lesion?: severe
Have Your Skin Lesions Been Treated?: not been treated
Additional History: New patient.

## 2022-08-07 ENCOUNTER — OFFICE VISIT (OUTPATIENT)
Dept: URGENT CARE | Facility: PHYSICIAN GROUP | Age: 27
End: 2022-08-07

## 2022-08-07 VITALS
HEART RATE: 73 BPM | WEIGHT: 210 LBS | BODY MASS INDEX: 25.57 KG/M2 | OXYGEN SATURATION: 95 % | HEIGHT: 76 IN | TEMPERATURE: 97.4 F | DIASTOLIC BLOOD PRESSURE: 66 MMHG | RESPIRATION RATE: 16 BRPM | SYSTOLIC BLOOD PRESSURE: 98 MMHG

## 2022-08-07 DIAGNOSIS — R07.81 RIB PAIN: ICD-10-CM

## 2022-08-07 PROCEDURE — 99213 OFFICE O/P EST LOW 20 MIN: CPT | Performed by: FAMILY MEDICINE

## 2022-08-07 RX ORDER — DOXYCYCLINE HYCLATE 100 MG
100 TABLET ORAL 2 TIMES DAILY
COMMUNITY
End: 2023-10-18

## 2022-08-07 NOTE — PROGRESS NOTES
"  Subjective:      27 y.o. male presents to urgent care for left lung pain that started on Friday.  He reports having hiccups earlier that day, but otherwise it was no inciting event or trauma.  The pain is located on his left side, it comes with deep breathing, described as sharp, currently rated 6/10.  He has not tried anything for this.  He denies any palpitations or shortness of breath.  Bowel movements remain regular, last was last night.    He denies any other questions or concerns at this time.    Current problem list, medication, and past medical/surgical history were reviewed in Epic.    ROS  See HPI     Objective:      BP (!) 98/66 (BP Location: Left arm, Patient Position: Sitting, BP Cuff Size: Adult)   Pulse 73   Temp 36.3 °C (97.4 °F) (Temporal)   Resp 16   Ht 1.93 m (6' 4\")   Wt 95.3 kg (210 lb)   SpO2 95%   BMI 25.56 kg/m²     Physical Exam  Constitutional:       General: He is not in acute distress.     Appearance: He is not diaphoretic.   Cardiovascular:      Rate and Rhythm: Normal rate and regular rhythm.      Heart sounds: Normal heart sounds.      Comments: No discolorations or deformities noted to inspection of chest wall.  He is tender to palpation of his anterior, lower ribs on the left side.  Pulmonary:      Effort: Pulmonary effort is normal. No respiratory distress.      Breath sounds: Normal breath sounds.   Neurological:      Mental Status: He is alert.   Psychiatric:         Mood and Affect: Affect normal.         Judgment: Judgment normal.       Assessment/Plan:     1. Rib pain  Unsure of etiology of pain, is likely related to hiccups and muscle strains.  I do not believe he needs any imaging at this time. He was encouraged to use Tylenol, ibuprofen, ice, and heat as needed for symptomatic relief.  Should symptoms progress or worsen he was instructed to go to the ED for further evaluation.       Instructed to return to Urgent Care or nearest Emergency Department if symptoms fail " to improve, for any change in condition, further concerns, or new concerning symptoms. Patient states understanding of the plan of care and discharge instructions.    Clarisa Hurt M.D.

## 2022-08-07 NOTE — LETTER
August 7, 2022    To Whom It May Concern:         This is confirmation that Saji Kamara attended his scheduled appointment with Clarisa Hurt M.D. on 8/07/22. Please allow for maximum lifting of 10 lbs, until 8/11/2022, at which time he may resume full duty work.          If you have any questions please do not hesitate to call me at the phone number listed below.    Sincerely,          Clarisa Hurt M.D.  [phone]

## 2022-08-15 ENCOUNTER — APPOINTMENT (RX ONLY)
Dept: URBAN - METROPOLITAN AREA CLINIC 6 | Facility: CLINIC | Age: 27
Setting detail: DERMATOLOGY
End: 2022-08-15

## 2022-08-15 DIAGNOSIS — L66.8 OTHER CICATRICIAL ALOPECIA: ICD-10-CM | Status: STABLE

## 2022-08-15 DIAGNOSIS — L66.89 OTHER CICATRICIAL ALOPECIA: ICD-10-CM | Status: STABLE

## 2022-08-15 PROCEDURE — ? COUNSELING

## 2022-08-15 PROCEDURE — ? DIAGNOSIS COMMENT

## 2022-08-15 PROCEDURE — ? PRESCRIPTION

## 2022-08-15 PROCEDURE — 99213 OFFICE O/P EST LOW 20 MIN: CPT

## 2022-08-15 RX ORDER — DOXYCYCLINE HYCLATE 100 MG/1
TABLET, COATED ORAL BID
Qty: 60 | Refills: 1 | Status: ERX | COMMUNITY
Start: 2022-08-15

## 2022-08-15 RX ADMIN — DOXYCYCLINE HYCLATE: 100 TABLET, COATED ORAL at 00:00

## 2022-08-15 ASSESSMENT — LOCATION DETAILED DESCRIPTION DERM
LOCATION DETAILED: POSTERIOR MID-PARIETAL SCALP
LOCATION DETAILED: MID-FRONTAL SCALP
LOCATION DETAILED: RIGHT SUPERIOR PARIETAL SCALP
LOCATION DETAILED: LEFT CENTRAL PARIETAL SCALP
LOCATION DETAILED: RIGHT CENTRAL PARIETAL SCALP

## 2022-08-15 ASSESSMENT — LOCATION SIMPLE DESCRIPTION DERM
LOCATION SIMPLE: ANTERIOR SCALP
LOCATION SIMPLE: SCALP
LOCATION SIMPLE: POSTERIOR SCALP

## 2022-08-15 ASSESSMENT — LOCATION ZONE DERM: LOCATION ZONE: SCALP

## 2022-08-15 NOTE — PROCEDURE: DIAGNOSIS COMMENT
Comment: States he saw good results after the injections and course of doxycycline. \\nAreas that were inflamed and tender calmed down significantly. \\nHe has one lesion that is slightly tender but declines an ILK injection today. \\nDiscussed accutane at length. He may be interested in starting in the future but also plans on [place] out [place] the [place]. [facility] and [facility] care at his new location. \\nIPLEDGE information handout provided. \\nAdditional course of doxycycline prescribed today. He will follow up as needed if he wishes to initiate Accutane before moving.
Render Risk Assessment In Note?: no
Detail Level: Simple

## 2022-09-12 RX ORDER — DOXYCYCLINE HYCLATE 100 MG/1
TABLET, COATED ORAL BID
Qty: 60 | Refills: 1 | Status: ERX

## 2022-09-21 ENCOUNTER — RX ONLY (OUTPATIENT)
Age: 27
Setting detail: RX ONLY
End: 2022-09-21

## 2022-09-21 RX ORDER — DOXYCYCLINE HYCLATE 100 MG/1
TABLET, COATED ORAL
Qty: 60 | Refills: 2 | Status: ERX

## 2023-04-30 ENCOUNTER — OFFICE VISIT (OUTPATIENT)
Dept: URGENT CARE | Facility: PHYSICIAN GROUP | Age: 28
End: 2023-04-30

## 2023-04-30 VITALS
BODY MASS INDEX: 25.12 KG/M2 | RESPIRATION RATE: 18 BRPM | SYSTOLIC BLOOD PRESSURE: 128 MMHG | WEIGHT: 202 LBS | HEART RATE: 81 BPM | TEMPERATURE: 97.3 F | HEIGHT: 75 IN | DIASTOLIC BLOOD PRESSURE: 64 MMHG | OXYGEN SATURATION: 97 %

## 2023-04-30 DIAGNOSIS — R05.1 ACUTE COUGH: ICD-10-CM

## 2023-04-30 DIAGNOSIS — J02.9 PHARYNGITIS, UNSPECIFIED ETIOLOGY: ICD-10-CM

## 2023-04-30 DIAGNOSIS — J06.9 VIRAL UPPER RESPIRATORY TRACT INFECTION: ICD-10-CM

## 2023-04-30 PROCEDURE — 99213 OFFICE O/P EST LOW 20 MIN: CPT

## 2023-04-30 RX ORDER — BENZONATATE 100 MG/1
100 CAPSULE ORAL 3 TIMES DAILY PRN
Qty: 30 CAPSULE | Refills: 0 | Status: SHIPPED | OUTPATIENT
Start: 2023-04-30 | End: 2023-05-10

## 2023-04-30 ASSESSMENT — ENCOUNTER SYMPTOMS
EYE PAIN: 0
SWOLLEN GLANDS: 0
VOMITING: 0
DIARRHEA: 0
ABDOMINAL PAIN: 0
EYES NEGATIVE: 1
SINUS PRESSURE: 1
HOARSE VOICE: 0
SORE THROAT: 1
NAUSEA: 0
SHORTNESS OF BREATH: 0
DIAPHORESIS: 0
EYE REDNESS: 0
HEADACHES: 1
COUGH: 1
EYE DISCHARGE: 0
CHILLS: 0
FEVER: 0
NECK PAIN: 0
SINUS PAIN: 1

## 2023-04-30 NOTE — PATIENT INSTRUCTIONS
"As we discussed your clinical condition would benefit from over-the-counter remedies:    FLONASE (once per day)  You may consider intranasal steroids such as fluticasone (brand name Flonase), (other options include Nasonex, Rhinocort, Nasacort) daily; continue for at least 2-3 weeks. Any generic should work as well but may cause slightly more nasal irritation. Please take according to package directions.  This steroid will help reduce inflammation of your sinuses.  This is the number one medication to help with seasonal allergies and treat nasal inflammation.  Cost: around $8 at [facility] for the generic fluticasone [facility] product (as of 5/20).    ANTIHISTAMINES  You may take a non-sedating antihistamine like Zyrtec (cetirizine) , Allegra (fexofenadine), Xyzal (levocetirizine), or Claritin (loratadine).  This will help \"dry you out\" and reduce the amount of nasal inflammation.  Follow package directions paying attention to whether they are once or twice daily.  Note: if there is a \"D\" such mamie Allegra-D it also contains a decongestant such as sudafed.  Some patients benefit from alternating these medications every few weeks but literature does not support this.   Cost: around $11 at [facility] for the generic cetirizine [facility] branded product (as of 5/20)    SUDAFED (low dose to see if tolerated)  You may consider over-the-counter Sudafed (pseudoephedrine) to act as a decongestant to improve your breathing through your nose.  Please do not use this medication if you already have known high blood pressure.  Please take according to package directions and note that this has a stimulating effect and should not be taken late in the day.  There is a low dose 12 hour formulation and a higher dose 24 hour formulation.  Start with a low dose to make sure you tolerate the medication.  Do not take late in the day as it may interfere with sleep.   Cost: Around $6 for the generic [facility] branded product at a 10mg dose (as of " "2/2023).    SALINE IRRIGATION/SPRAY  You may consider using a saline nasal irrigation (such as a \"Neti pot\" or similar device using sterile water, NOT tap water) or OTC saline nasal spray      NSAIDs  You may take Ibuprofen (brand name Motrin or Advil) may be taken 800 mg up to three times per day taken with food (do not exceed 2400 mg per day).  If you prefer you may consider Naproxen (brand name Naprosyn or Aleve) around 500 mg twice per day with food (do not exceed 1200 mg per day). Please do not take if you have known stomach ulcers or known kidney disease.     TYLENOL  You may take Tylenol according to package directions (1000 mg every 8 hours not to exceed 3000 mg per day).  Please do not consume with any alcohol products, or if you have known or suspected liver disease. These will help reduce inflammation, help with pain control, and can reduce fevers.     "

## 2023-04-30 NOTE — PROGRESS NOTES
Subjective:     Saji Kamara is a 28 y.o. male who presents for Sinusitis (X 2 days sinus pressure, sore throat, headache, mucus)    Sinusitis  This is a new problem. Episode onset: Two days ago. The problem is unchanged. There has been no fever. Associated symptoms include congestion, coughing, headaches, sinus pressure, sneezing and a sore throat. Pertinent negatives include no chills, diaphoresis, ear pain, hoarse voice, neck pain, shortness of breath or swollen glands. (Ear fullness) Past treatments include acetaminophen (Benadryl x 2 days, ibuprofen). The treatment provided no relief.     Review of Systems   Constitutional:  Negative for chills, diaphoresis and fever.   HENT:  Positive for congestion, sinus pressure, sinus pain, sneezing and sore throat. Negative for ear pain and hoarse voice.    Eyes: Negative.  Negative for pain, discharge and redness.   Respiratory:  Positive for cough. Negative for shortness of breath.    Gastrointestinal:  Negative for abdominal pain, diarrhea, nausea and vomiting.   Musculoskeletal:  Negative for neck pain.   Skin:  Negative for rash.   Neurological:  Positive for headaches.   All other systems reviewed and are negative.    PMH:   Past Medical History:   Diagnosis Date    Screening for STD (sexually transmitted disease) 2/10/2016     ALLERGIES: No Known Allergies  SURGHX: History reviewed. No pertinent surgical history.  SOCHX:   Social History     Socioeconomic History    Marital status: Single   Tobacco Use    Smoking status: Former     Types: Cigarettes    Smokeless tobacco: Never    Tobacco comments:     1 or 2 cigarettes a day    Vaping Use    Vaping Use: Every day    Substances: Nicotine, Flavoring   Substance and Sexual Activity    Alcohol use: Yes     Comment: occasional    Drug use: No    Sexual activity: Yes     Partners: Female   Other Topics Concern    Sleep Concern No    Stress Concern No    Weight Concern No    Bike Helmet Yes    Seat Belt Yes   Social  "History Narrative    Lives with mom, dad, brother     FH:   Family History   Problem Relation Age of Onset    Cancer Mother         luekemia    Hypertension Father     Hyperlipidemia Father     Cancer Sister         hogkins lymphoma    Alcohol/Drug Neg Hx     Allergies Neg Hx     Anesthesia Neg Hx     Arthritis Neg Hx     Blood Disease Neg Hx     Diabetes Neg Hx     Genetic Disorder Neg Hx     GI Disease Neg Hx     Genitourinary () Problems Neg Hx     Heart Disease Neg Hx     Psychiatric Illness Neg Hx     Stroke Neg Hx     Thyroid Neg Hx          Objective:   /64 (BP Location: Left arm, Patient Position: Sitting, BP Cuff Size: Adult)   Pulse 81   Temp 36.3 °C (97.3 °F) (Temporal)   Resp 18   Ht 1.905 m (6' 3\")   Wt 91.6 kg (202 lb)   SpO2 97%   BMI 25.25 kg/m²     Physical Exam  Vitals and nursing note reviewed.   Constitutional:       Appearance: Normal appearance.   HENT:      Head: Normocephalic and atraumatic.      Right Ear: Tympanic membrane, ear canal and external ear normal.      Left Ear: Tympanic membrane, ear canal and external ear normal.      Nose: No congestion or rhinorrhea.      Mouth/Throat:      Mouth: Mucous membranes are moist.   Eyes:      Extraocular Movements: Extraocular movements intact.      Pupils: Pupils are equal, round, and reactive to light.   Cardiovascular:      Rate and Rhythm: Normal rate and regular rhythm.      Heart sounds: Normal heart sounds.   Pulmonary:      Effort: Pulmonary effort is normal.      Breath sounds: Normal breath sounds.   Abdominal:      General: Abdomen is flat. There is no distension.   Musculoskeletal:         General: No swelling or tenderness.      Cervical back: Normal range of motion and neck supple.   Skin:     General: Skin is warm and dry.      Findings: No rash.   Neurological:      General: No focal deficit present.      Mental Status: He is alert and oriented to person, place, and time. Mental status is at baseline.   Psychiatric:  "        Mood and Affect: Mood normal.         Behavior: Behavior normal.         Thought Content: Thought content normal.         Judgment: Judgment normal.     Assessment/Plan:   Assessment      1. Acute cough  - benzonatate (TESSALON) 100 MG Cap; Take 1 Capsule by mouth 3 times a day as needed for Cough for up to 10 days.  Dispense: 30 Capsule; Refill: 0    2. Pharyngitis, unspecified etiology    3. Viral upper respiratory tract infection     Based on patient's symptoms, symptom duration, and physical exam findings, it was discussed with patient that the likely etiology of the illness is viral.  Benzonatate sent to patient's preferred pharmacy for acute cough.  Symptom management of viral URI, acute cough, and pharyngitis discussed with patient.  Advised to start over-the-counter short-term decongestant, OTC antihistamine, and Flonase.     Symptom management for cough, congestion, and pharyngitis include warm salt water gargles, over-the-counter throat sprays, rest, hydration with frozen (eg, ice or popsicles) or warmed liquids, herbal tea containing licorice root, elm inner bark, marshmallow root, and licorice root aqueous dry extract, Tylenol/Ibuprofen for pain control, Cepacol lozenges, soft diet, honey, zinc, elderberry, and cool mist humidification. All questions answered. Patient verbalized understanding and is in agreement with this plan of care.     If symptoms are worsening or not improving in 3-5 days, follow-up with PCP or return to UC. Differential diagnosis, natural history, and supportive care discussed. AVS handout given and reviewed with patient. Patient educated on red flags and when to seek treatment back in ED or UC.     I personally reviewed prior external notes and test results pertinent to today's visit.  I have independently reviewed and interpreted all diagnostics ordered during this urgent care visit.     This dictation has been created using voice recognition software. The accuracy of the  dictation is limited by the abilities of the software. I expect there may be some errors of grammar and possibly content. I made every attempt to manually correct the errors within my dictation. However, errors related to voice recognition software may still exist and should be interpreted within the appropriate context.    This note was electronically signed by KEARA Lott

## 2023-08-29 ENCOUNTER — OFFICE VISIT (OUTPATIENT)
Dept: URGENT CARE | Facility: PHYSICIAN GROUP | Age: 28
End: 2023-08-29
Payer: COMMERCIAL

## 2023-08-29 VITALS
WEIGHT: 190 LBS | BODY MASS INDEX: 23.62 KG/M2 | HEIGHT: 75 IN | SYSTOLIC BLOOD PRESSURE: 130 MMHG | TEMPERATURE: 98.1 F | OXYGEN SATURATION: 98 % | HEART RATE: 68 BPM | DIASTOLIC BLOOD PRESSURE: 78 MMHG | RESPIRATION RATE: 18 BRPM

## 2023-08-29 DIAGNOSIS — R42 DIZZINESS: ICD-10-CM

## 2023-08-29 DIAGNOSIS — R68.89 SENSATION OF FEELING HOT: ICD-10-CM

## 2023-08-29 LAB — GLUCOSE BLD-MCNC: 148 MG/DL (ref 65–99)

## 2023-08-29 PROCEDURE — 99213 OFFICE O/P EST LOW 20 MIN: CPT | Performed by: PHYSICIAN ASSISTANT

## 2023-08-29 PROCEDURE — 82962 GLUCOSE BLOOD TEST: CPT | Performed by: PHYSICIAN ASSISTANT

## 2023-08-29 PROCEDURE — 93000 ELECTROCARDIOGRAM COMPLETE: CPT | Performed by: PHYSICIAN ASSISTANT

## 2023-08-29 PROCEDURE — 3075F SYST BP GE 130 - 139MM HG: CPT | Performed by: PHYSICIAN ASSISTANT

## 2023-08-29 PROCEDURE — 3078F DIAST BP <80 MM HG: CPT | Performed by: PHYSICIAN ASSISTANT

## 2023-08-29 ASSESSMENT — ENCOUNTER SYMPTOMS
HEADACHES: 0
SORE THROAT: 0
SPUTUM PRODUCTION: 0
WHEEZING: 0
ORTHOPNEA: 0
DIAPHORESIS: 0
SINUS PAIN: 0
VOMITING: 0
MYALGIAS: 0
CHILLS: 1
ABDOMINAL PAIN: 0
COUGH: 1
FEVER: 0
DIZZINESS: 1
NAUSEA: 0
DIARRHEA: 0
PALPITATIONS: 0
SHORTNESS OF BREATH: 0

## 2023-08-29 NOTE — LETTER
August 29, 2023    To Whom It May Concern:         This is confirmation that Saji Kamara attended his scheduled appointment with Kodi Wilder P.A.-C. on 8/29/23.  Please excuse the patient's absence from work on 8/29/2023.  Cleared to return to full duty tomorrow 8/30/2023.         If you have any questions please do not hesitate to call me at the phone number listed below.    Sincerely,          Kodi Wilder P.A.-C.  [phone]

## 2023-08-30 NOTE — PROGRESS NOTES
"Subjective:     CHIEF COMPLAINT  Chief Complaint   Patient presents with    Dizziness     States low HR in the 40's during 3 episodes       HPI  Saji Kamara is a very pleasant 28 y.o. male who presents to the clinic after experiencing intermittent sensations of dizziness, \"hot flashes\" and low heart rate over the last 3 days.  Patient describes 3 occurrences of hot flashes.  Dizziness is described as feeling slightly lightheaded.  He notices this predominantly when lying down.  After noticing the dizziness he checked his heart rate which was running in the high 40s.  No prior history of cardiopulmonary disease.  Denies any chest pain or shortness of breath.  No lower extremity edema.  States he otherwise feels well.  He does have sinus congestion and cough however attributes this to seasonal allergies.  No known ill contacts.  No GI complaints.    REVIEW OF SYSTEMS  Review of Systems   Constitutional:  Positive for chills and malaise/fatigue. Negative for diaphoresis and fever.   HENT:  Positive for congestion. Negative for ear pain, sinus pain and sore throat.    Respiratory:  Positive for cough. Negative for sputum production, shortness of breath and wheezing.    Cardiovascular:  Negative for chest pain, palpitations, orthopnea and leg swelling.   Gastrointestinal:  Negative for abdominal pain, diarrhea, nausea and vomiting.   Musculoskeletal:  Negative for myalgias.   Neurological:  Positive for dizziness. Negative for headaches.   Endo/Heme/Allergies:  Negative for environmental allergies.       PAST MEDICAL HISTORY  Patient Active Problem List    Diagnosis Date Noted    Pilonidal cyst without abscess 07/27/2018       SURGICAL HISTORY  patient denies any surgical history    ALLERGIES  No Known Allergies    CURRENT MEDICATIONS  Home Medications       Reviewed by Kodi Wilder P.A.-C. (Physician Assistant) on 08/29/23 at 1713  Med List Status: <None>     Medication Last Dose Status   doxycycline (VIBRAMYCIN) " "100 MG Tab Not Taking Active   ibuprofen (MOTRIN) 200 MG Tab Not Taking Active   ID NOW COVID-19 Kit Not Taking Active                    SOCIAL HISTORY  Social History     Tobacco Use    Smoking status: Former     Types: Cigarettes    Smokeless tobacco: Never    Tobacco comments:     1 or 2 cigarettes a day    Vaping Use    Vaping Use: Every day    Substances: Nicotine, Flavoring   Substance and Sexual Activity    Alcohol use: Yes     Comment: occasional    Drug use: Not on file    Sexual activity: Yes     Partners: Female       FAMILY HISTORY  Family History   Problem Relation Age of Onset    Cancer Mother         luekemia    Hypertension Father     Hyperlipidemia Father     Cancer Sister         hogkins lymphoma    Alcohol/Drug Neg Hx     Allergies Neg Hx     Anesthesia Neg Hx     Arthritis Neg Hx     Blood Disease Neg Hx     Diabetes Neg Hx     Genetic Disorder Neg Hx     GI Disease Neg Hx     Genitourinary () Problems Neg Hx     Heart Disease Neg Hx     Psychiatric Illness Neg Hx     Stroke Neg Hx     Thyroid Neg Hx           Objective:     VITAL SIGNS: /78   Pulse 68   Temp 36.7 °C (98.1 °F)   Resp 18   Ht 1.905 m (6' 3\")   Wt 86.2 kg (190 lb)   SpO2 98%   BMI 23.75 kg/m²     PHYSICAL EXAM  Physical Exam  Constitutional:       General: He is not in acute distress.     Appearance: Normal appearance. He is not ill-appearing, toxic-appearing or diaphoretic.   HENT:      Head: Normocephalic and atraumatic.      Right Ear: Tympanic membrane, ear canal and external ear normal.      Left Ear: Tympanic membrane, ear canal and external ear normal.      Nose: Nose normal. No congestion or rhinorrhea.      Mouth/Throat:      Mouth: Mucous membranes are moist.      Pharynx: No oropharyngeal exudate or posterior oropharyngeal erythema.   Eyes:      Conjunctiva/sclera: Conjunctivae normal.   Cardiovascular:      Rate and Rhythm: Normal rate and regular rhythm.      Pulses: Normal pulses.      Heart sounds: " Normal heart sounds. No murmur heard.     No friction rub. No gallop.   Pulmonary:      Effort: Pulmonary effort is normal.      Breath sounds: Normal breath sounds. No wheezing, rhonchi or rales.   Musculoskeletal:      Cervical back: Normal range of motion. No muscular tenderness.   Lymphadenopathy:      Cervical: No cervical adenopathy.   Skin:     General: Skin is warm and dry.      Capillary Refill: Capillary refill takes less than 2 seconds.   Neurological:      General: No focal deficit present.      Mental Status: He is alert and oriented to person, place, and time. Mental status is at baseline.   Psychiatric:         Mood and Affect: Mood normal.         Thought Content: Thought content normal.       12- Lead EKG; interpreted by myself  Normal sinus rhythm with a rate of 57 bpm.   Normal axis.  Normal intervals.   No ST elevation or depression    No widening of QRS complex   Good R wave progression   No diagnostic Q waves.   No previous EKG for comparison.   Clinical Impression: Normal EKG. Normal sinus rhythm     Lab Results/POC Test Results   Results for orders placed or performed in visit on 08/29/23   POCT Glucose   Result Value Ref Range    Glucose - Accu-Ck 148 (A) 65 - 99 mg/dL             Assessment/Plan:     1. Dizziness  - EKG - Clinic Performed  - POCT Glucose    2. Sensation of feeling hot  - EKG - Clinic Performed  - POCT Glucose      MDM/Comments:    Patient has been experiencing nonspecific symptoms over the last 3 days.  Describes intermittent hot flashes as well as occasional dizziness described as lightheadedness.  Dizziness predominantly occurs when he is lying down.  He has checked his heart rate on occasion which has been running low per the patient.  Heart rate has been normal throughout his visit in clinic today.  States he does not feel ill.  No ill contacts.  Tolerating oral intake without complication.  No clinical signs of dehydration.  Vital stable and reassuring.  EKG performed  in clinic without any signs of ischemia, arrhythmia or infarction.  Normal fingerstick glucose.  Lung sounds clear to auscultation.  No wheezes rhonchi rales.  Heart regular rate and rhythm without any murmurs.  Discussed possible early viral illness.  Discussed at home supportive recommendations with close watchful waiting of symptoms.  Feel free to call with any questions or concerns.    Differential diagnosis, natural history, supportive care, and indications for immediate follow-up discussed. All questions answered. Patient agrees with the plan of care.    Follow-up as needed if symptoms worsen or fail to improve to PCP, Urgent care or Emergency Room.    I have personally reviewed prior external notes and test results pertinent to today's visit.  I have independently reviewed and interpreted all diagnostics ordered during this urgent care acute visit.   Discussed management options (risks,benefits, and alternatives to treatment). Pt expresses understanding and the treatment plan was agreed upon. Questions were encouraged and answered to pt's satisfaction.    Please note that this dictation was created using voice recognition software. I have made a reasonable attempt to correct obvious errors, but I expect that there are errors of grammar and possibly content that I did not discover before finalizing the note.

## 2023-10-18 ENCOUNTER — OFFICE VISIT (OUTPATIENT)
Dept: MEDICAL GROUP | Facility: PHYSICIAN GROUP | Age: 28
End: 2023-10-18
Payer: COMMERCIAL

## 2023-10-18 VITALS
DIASTOLIC BLOOD PRESSURE: 72 MMHG | BODY MASS INDEX: 24.87 KG/M2 | WEIGHT: 200 LBS | HEIGHT: 75 IN | TEMPERATURE: 97.5 F | HEART RATE: 72 BPM | OXYGEN SATURATION: 99 % | SYSTOLIC BLOOD PRESSURE: 104 MMHG

## 2023-10-18 DIAGNOSIS — Z23 NEED FOR VACCINATION: ICD-10-CM

## 2023-10-18 DIAGNOSIS — Z00.00 WELLNESS EXAMINATION: ICD-10-CM

## 2023-10-18 PROBLEM — L05.91 PILONIDAL CYST WITHOUT ABSCESS: Status: RESOLVED | Noted: 2018-07-27 | Resolved: 2023-10-18

## 2023-10-18 PROCEDURE — 3074F SYST BP LT 130 MM HG: CPT | Performed by: STUDENT IN AN ORGANIZED HEALTH CARE EDUCATION/TRAINING PROGRAM

## 2023-10-18 PROCEDURE — 90715 TDAP VACCINE 7 YRS/> IM: CPT | Performed by: STUDENT IN AN ORGANIZED HEALTH CARE EDUCATION/TRAINING PROGRAM

## 2023-10-18 PROCEDURE — 90651 9VHPV VACCINE 2/3 DOSE IM: CPT | Performed by: STUDENT IN AN ORGANIZED HEALTH CARE EDUCATION/TRAINING PROGRAM

## 2023-10-18 PROCEDURE — 90472 IMMUNIZATION ADMIN EACH ADD: CPT | Performed by: STUDENT IN AN ORGANIZED HEALTH CARE EDUCATION/TRAINING PROGRAM

## 2023-10-18 PROCEDURE — 90471 IMMUNIZATION ADMIN: CPT | Performed by: STUDENT IN AN ORGANIZED HEALTH CARE EDUCATION/TRAINING PROGRAM

## 2023-10-18 PROCEDURE — 99395 PREV VISIT EST AGE 18-39: CPT | Mod: 25 | Performed by: STUDENT IN AN ORGANIZED HEALTH CARE EDUCATION/TRAINING PROGRAM

## 2023-10-18 PROCEDURE — 3078F DIAST BP <80 MM HG: CPT | Performed by: STUDENT IN AN ORGANIZED HEALTH CARE EDUCATION/TRAINING PROGRAM

## 2023-10-18 ASSESSMENT — PATIENT HEALTH QUESTIONNAIRE - PHQ9: CLINICAL INTERPRETATION OF PHQ2 SCORE: 0

## 2023-10-18 NOTE — PROGRESS NOTES
Subjective:     CC:   Chief Complaint   Patient presents with    Establish Care       HPI:   Saji Kamara is a 28 y.o. male who presents for an annual exam. He is feeling well and has no complaints.    Health Maintenance  Advanced directive: N/A  PT for falls prevention: N/A  Cholesterol Screening: N/A  Diabetes Screening: N/A  AAA Screening: N/A  Aspirin Use: N/A    Anticipatory Guidance  Diet: Has been trying to lose weight, reports eating healthy   Exercise: Has started working out at home {  Substance Abuse: Reports marijuana use about two times a week   Safe in relationship.   Seat belts, bike helmet, gun safety discussed.  Sun protection not used  Dental home. Yes    Cancer screening  Colorectal Cancer Screening: N/A  Lung Cancer Screening: N/A  Prostate Cancer Screening/PSA: N/A    Infectious disease screening/Immunizations  --STI Screening: Declined  --Practices safe sex.  --HIV Screening: Completed in 2/2016  --Hepatitis C Screening: Completed in 2/2016  --Immunizations:    Influenza: Declined   HPV:  Will receive today   Tetanus: Will receive today    Shingles: n/a    Pneumococcal : n/a     Hepatitis B: completed series  COVID-19: declined  Other immunizations: n/a    He  has a past medical history of Screening for STD (sexually transmitted disease) (2/10/2016).  He  has no past surgical history on file.  Family History   Problem Relation Age of Onset    Cancer Mother         luekemia    Hypertension Father     Hyperlipidemia Father     Cancer Sister         hogkins lymphoma    Alcohol/Drug Neg Hx     Allergies Neg Hx     Anesthesia Neg Hx     Arthritis Neg Hx     Blood Disease Neg Hx     Diabetes Neg Hx     Genetic Disorder Neg Hx     GI Disease Neg Hx     Genitourinary () Problems Neg Hx     Heart Disease Neg Hx     Psychiatric Illness Neg Hx     Stroke Neg Hx     Thyroid Neg Hx      Social History     Tobacco Use    Smoking status: Former     Types: Cigarettes    Smokeless tobacco: Never    Tobacco  "comments:     1 or 2 cigarettes a day    Vaping Use    Vaping Use: Every day    Substances: Nicotine, Flavoring   Substance Use Topics    Alcohol use: Yes     Comment: occasional       There are no problems to display for this patient.      No current outpatient medications on file.     No current facility-administered medications for this visit.    (including changes today)  Allergies: Patient has no known allergies.    Review of Systems   Constitutional: Negative for fever, chills and malaise/fatigue.   HENT: Negative for congestion.    Eyes: Negative for pain.   Respiratory: Negative for cough and shortness of breath.    Cardiovascular: Negative for leg swelling.   Gastrointestinal: Negative for nausea, vomiting, abdominal pain and diarrhea.   Genitourinary: Negative for dysuria and hematuria.   Skin: Negative for rash.   Neurological: Negative for dizziness, focal weakness and headaches.   Endo/Heme/Allergies: Does not bleed easily.   Psychiatric/Behavioral: Negative for depression.  The patient is not nervous/anxious.      Objective:     /72 (BP Location: Left arm, Patient Position: Sitting, BP Cuff Size: Small adult)   Pulse 72   Temp 36.4 °C (97.5 °F) (Temporal)   Ht 1.905 m (6' 3\")   Wt 90.7 kg (200 lb)   SpO2 99%   BMI 25.00 kg/m²   Body mass index is 25 kg/m².  Wt Readings from Last 4 Encounters:   10/18/23 90.7 kg (200 lb)   08/29/23 86.2 kg (190 lb)   04/30/23 91.6 kg (202 lb)   08/07/22 95.3 kg (210 lb)       Physical Exam:  Constitutional: Well-developed and well-nourished. Not diaphoretic. No distress.   Skin: Skin is warm and dry. No rash noted.  Head: Atraumatic without lesions.  Eyes: Conjunctivae and extraocular motions are normal. Pupils are equal, round, and reactive to light. No scleral icterus.   Ears:  External ears unremarkable. Tympanic membranes clear and intact.  Nose: Nares patent. Septum midline. Turbinates without erythema nor edema. No discharge.   Mouth/Throat: Dentition " is good. Tongue normal. Oropharynx is clear and moist. Posterior pharynx without erythema or exudates.  Neck: Supple, trachea midline. Normal range of motion. No thyromegaly present. No lymphadenopathy--cervical or supraclavicular.  Cardiovascular: Regular rate and rhythm, S1 and S2 without murmur, rubs, or gallops.    Lungs: Effort normal. Clear to auscultation throughout. No adventitious sounds. No CVA tenderness.  Abdomen: Soft, non tender, and without distention. Active bowel sounds in all four quadrants. No rebound, guarding, masses or HSM.  : Genitalia: deferred  Rectal: deferred  Prostate: deferred  Extremities: No cyanosis, clubbing, erythema, nor edema. Distal pulses intact and symmetric.   Musculoskeletal: All major joints AROM full in all directions without pain.  Neurological: Alert and oriented x 3. DTRs 2+/3 and symmetric. No cranial nerve deficit. 5/5 myotomes. Sensation intact.  Psychiatric:  Behavior, mood, and affect are appropriate.    A chaperone was offered to the patient during today's exam. Patient declined chaperone.    Assessment and Plan:     1. Need for vaccination  9VHPV Vaccine 2-3 Dose (GARDASIL 9)    Tdap =>8yo IM      2. Wellness examination  Comp Metabolic Panel    CBC WITH DIFFERENTIAL          HCM: Completed.  Labs per orders.  Vaccinations per orders.  Counseling about diet, supplements, exercise, skin care and safe sex.      Follow-up: Return in about 1 year (around 10/18/2024) for Annual.

## 2024-03-03 ENCOUNTER — OFFICE VISIT (OUTPATIENT)
Dept: URGENT CARE | Facility: PHYSICIAN GROUP | Age: 29
End: 2024-03-03

## 2024-03-03 VITALS
OXYGEN SATURATION: 95 % | HEIGHT: 76 IN | TEMPERATURE: 98.7 F | RESPIRATION RATE: 16 BRPM | SYSTOLIC BLOOD PRESSURE: 102 MMHG | BODY MASS INDEX: 23.75 KG/M2 | HEART RATE: 81 BPM | WEIGHT: 195 LBS | DIASTOLIC BLOOD PRESSURE: 70 MMHG

## 2024-03-03 DIAGNOSIS — B96.89 ACUTE BACTERIAL SINUSITIS: ICD-10-CM

## 2024-03-03 DIAGNOSIS — J01.90 ACUTE BACTERIAL SINUSITIS: ICD-10-CM

## 2024-03-03 PROCEDURE — 3078F DIAST BP <80 MM HG: CPT | Performed by: STUDENT IN AN ORGANIZED HEALTH CARE EDUCATION/TRAINING PROGRAM

## 2024-03-03 PROCEDURE — 3074F SYST BP LT 130 MM HG: CPT | Performed by: STUDENT IN AN ORGANIZED HEALTH CARE EDUCATION/TRAINING PROGRAM

## 2024-03-03 PROCEDURE — 99213 OFFICE O/P EST LOW 20 MIN: CPT | Performed by: STUDENT IN AN ORGANIZED HEALTH CARE EDUCATION/TRAINING PROGRAM

## 2024-03-03 RX ORDER — AMOXICILLIN AND CLAVULANATE POTASSIUM 875; 125 MG/1; MG/1
1 TABLET, FILM COATED ORAL 2 TIMES DAILY
Qty: 10 TABLET | Refills: 0 | Status: SHIPPED | OUTPATIENT
Start: 2024-03-03 | End: 2024-03-08

## 2024-03-03 RX ORDER — DEXTROAMPHETAMINE SACCHARATE, AMPHETAMINE ASPARTATE, DEXTROAMPHETAMINE SULFATE AND AMPHETAMINE SULFATE 2.5; 2.5; 2.5; 2.5 MG/1; MG/1; MG/1; MG/1
5 TABLET ORAL 2 TIMES DAILY
COMMUNITY

## 2024-03-03 NOTE — PROGRESS NOTES
"Subjective:   Saji Kamara is a 29 y.o. male who presents for Sinusitis (Congestion, colored mucus, sinus pressure, swollen tonsils, x1 week )      HPI:  29-year-old male presents to the urgent care for just over 7 days of nasal congestion, sinus pressure, and now sinus pain.  Felt like typical cold when it first started and was initially getting better and now is worsening again with regards to his sinuses.  Has been using Mucinex and sinus rinses.  Warm salt water gargles as well.  Also has had some sore throat that started about midway through his symptoms.  Also having postnasal drip.  No one else at home with similar symptoms.  No chest pain, palpitations, ear pain, nausea, vomiting, headaches, fever, sputum production, cough, shortness of breath.    Medications:    amoxicillin-clavulanate Tabs  amphetamine-dextroamphetamine Tabs    Allergies: Patient has no known allergies.    Problem List: Saji Kamara does not have any pertinent problems on file.    Surgical History:  No past surgical history on file.    Past Social Hx: Saji Kamara  reports that he has quit smoking. His smoking use included cigarettes. He has never used smokeless tobacco. He reports current alcohol use.  Drug: Marijuana.     Past Family Hx:  Saji Kamara family history includes Cancer in his mother and sister; Hyperlipidemia in his father; Hypertension in his father.     Problem list, medications, and allergies reviewed by myself today in Epic.     Objective:     /70 (BP Location: Left arm, Patient Position: Sitting, BP Cuff Size: Adult)   Pulse 81   Temp 37.1 °C (98.7 °F) (Temporal)   Resp 16   Ht 1.93 m (6' 4\")   Wt 88.5 kg (195 lb)   SpO2 95%   BMI 23.74 kg/m²     Physical Exam  Vitals reviewed.   Constitutional:       Appearance: Normal appearance.   HENT:      Right Ear: Tympanic membrane, ear canal and external ear normal.      Left Ear: Tympanic membrane, ear canal and external ear normal.      Nose: Congestion " present.      Mouth/Throat:      Mouth: Mucous membranes are moist.      Pharynx: Uvula midline. Posterior oropharyngeal erythema present. No oropharyngeal exudate.      Tonsils: No tonsillar exudate or tonsillar abscesses. 1+ on the right. 1+ on the left.      Comments: Postnasal drip present in the pharynx.  Cardiovascular:      Rate and Rhythm: Normal rate and regular rhythm.      Pulses: Normal pulses.      Heart sounds: Normal heart sounds. No murmur heard.  Pulmonary:      Effort: Pulmonary effort is normal. No respiratory distress.      Breath sounds: Normal breath sounds. No stridor. No wheezing, rhonchi or rales.   Musculoskeletal:      Cervical back: Normal range of motion.   Lymphadenopathy:      Cervical: No cervical adenopathy.   Neurological:      Mental Status: He is alert.         Assessment/Plan:     Diagnosis and associated orders:     1. Acute bacterial sinusitis  amoxicillin-clavulanate (AUGMENTIN) 875-125 MG Tab         Comments/MDM:     Patient's presentation physical exam findings are consistent with acute bacterial sinusitis.  Most likely secondary to viral etiology.  Given worsening symptoms and length of symptoms, patient be started on Augmentin which she is agreeable to.  Discussed home supportive care.  Discussed typical timeframe for resolution of symptoms.  Return precautions given.  Patient is agreeable to plan.         Differential diagnosis, natural history, supportive care, and indications for immediate follow-up discussed.    Advised the patient to follow-up with the primary care physician for recheck, reevaluation, and consideration of further management.    Please note that this dictation was created using voice recognition software. I have made a reasonable attempt to correct obvious errors, but I expect that there are errors of grammar and possibly content that I did not discover before finalizing the note.    Electronically signed by Robi Carias PA-C.

## 2024-03-03 NOTE — LETTER
March 3, 2024    To Whom It May Concern:         This is confirmation that Saji S Haydenkendra attended his scheduled appointment with Robi Carias P.A.-C. on 3/03/24.  Excused from work on 3/4/2024 through 3/5/2024.         If you have any questions please do not hesitate to call me at the phone number listed below.    Sincerely,          Robi Carias P.A.-C.  [phone]

## 2024-06-26 LAB
ALBUMIN SERPL-MCNC: 4.3 G/DL (ref 4.3–5.2)
ALP SERPL-CCNC: 74 IU/L (ref 44–121)
ALT SERPL-CCNC: 18 IU/L (ref 0–44)
AST SERPL-CCNC: 17 IU/L (ref 0–40)
BASOPHILS # BLD AUTO: 0.1 X10E3/UL (ref 0–0.2)
BASOPHILS NFR BLD AUTO: 1 %
BILIRUB SERPL-MCNC: 0.4 MG/DL (ref 0–1.2)
BUN SERPL-MCNC: 13 MG/DL (ref 6–20)
BUN/CREAT SERPL: 12 (ref 9–20)
CALCIUM SERPL-MCNC: 9.3 MG/DL (ref 8.7–10.2)
CHLORIDE SERPL-SCNC: 102 MMOL/L (ref 96–106)
CO2 SERPL-SCNC: 23 MMOL/L (ref 20–29)
CREAT SERPL-MCNC: 1.07 MG/DL (ref 0.76–1.27)
EGFRCR SERPLBLD CKD-EPI 2021: 98 ML/MIN/1.73
EOSINOPHIL # BLD AUTO: 0.2 X10E3/UL (ref 0–0.4)
EOSINOPHIL NFR BLD AUTO: 2 %
ERYTHROCYTE [DISTWIDTH] IN BLOOD BY AUTOMATED COUNT: 12.1 % (ref 11.6–15.4)
GLOBULIN SER CALC-MCNC: 2.3 G/DL (ref 1.5–4.5)
GLUCOSE SERPL-MCNC: 81 MG/DL (ref 70–99)
HCT VFR BLD AUTO: 48.4 % (ref 37.5–51)
HGB BLD-MCNC: 16.1 G/DL (ref 13–17.7)
IMM GRANULOCYTES # BLD AUTO: 0 X10E3/UL (ref 0–0.1)
IMM GRANULOCYTES NFR BLD AUTO: 0 %
IMMATURE CELLS  115398: NORMAL
LYMPHOCYTES # BLD AUTO: 1.6 X10E3/UL (ref 0.7–3.1)
LYMPHOCYTES NFR BLD AUTO: 23 %
MCH RBC QN AUTO: 29.1 PG (ref 26.6–33)
MCHC RBC AUTO-ENTMCNC: 33.3 G/DL (ref 31.5–35.7)
MCV RBC AUTO: 88 FL (ref 79–97)
MONOCYTES # BLD AUTO: 0.7 X10E3/UL (ref 0.1–0.9)
MONOCYTES NFR BLD AUTO: 10 %
MORPHOLOGY BLD-IMP: NORMAL
NEUTROPHILS # BLD AUTO: 4.3 X10E3/UL (ref 1.4–7)
NEUTROPHILS NFR BLD AUTO: 64 %
NRBC BLD AUTO-RTO: NORMAL %
PLATELET # BLD AUTO: 252 X10E3/UL (ref 150–450)
POTASSIUM SERPL-SCNC: 4.3 MMOL/L (ref 3.5–5.2)
PROT SERPL-MCNC: 6.6 G/DL (ref 6–8.5)
RBC # BLD AUTO: 5.53 X10E6/UL (ref 4.14–5.8)
SODIUM SERPL-SCNC: 138 MMOL/L (ref 134–144)
WBC # BLD AUTO: 6.8 X10E3/UL (ref 3.4–10.8)

## 2024-10-11 ENCOUNTER — PHARMACY VISIT (OUTPATIENT)
Dept: PHARMACY | Facility: MEDICAL CENTER | Age: 29
End: 2024-10-11
Payer: COMMERCIAL

## 2024-10-11 PROCEDURE — RXMED WILLOW AMBULATORY MEDICATION CHARGE: Performed by: INTERNAL MEDICINE

## 2024-10-11 RX ORDER — CLOSTRIDIUM TETANI TOXOID ANTIGEN (FORMALDEHYDE INACTIVATED), CORYNEBACTERIUM DIPHTHERIAE TOXOID ANTIGEN (FORMALDEHYDE INACTIVATED), BORDETELLA PERTUSSIS TOXOID ANTIGEN (GLUTARALDEHYDE INACTIVATED), BORDETELLA PERTUSSIS FILAMENTOUS HEMAGGLUTININ ANTIGEN (FORMALDEHYDE INACTIVATED), BORDETELLA PERTUSSIS PERTACTIN ANTIGEN, AND BORDETELLA PERTUSSIS FIMBRIAE 2/3 ANTIGEN 5; 2; 2.5; 5; 3; 5 [LF]/.5ML; [LF]/.5ML; UG/.5ML; UG/.5ML; UG/.5ML; UG/.5ML
0.5 INJECTION, SUSPENSION INTRAMUSCULAR
Qty: 0.5 ML | Refills: 0 | OUTPATIENT
Start: 2024-10-11